# Patient Record
Sex: FEMALE | Race: WHITE | Employment: PART TIME | URBAN - METROPOLITAN AREA
[De-identification: names, ages, dates, MRNs, and addresses within clinical notes are randomized per-mention and may not be internally consistent; named-entity substitution may affect disease eponyms.]

---

## 2017-01-30 ENCOUNTER — HOSPITAL ENCOUNTER (OUTPATIENT)
Dept: MAMMOGRAPHY | Age: 67
Discharge: HOME | End: 2017-01-30
Attending: OBSTETRICS & GYNECOLOGY
Payer: COMMERCIAL

## 2017-01-30 DIAGNOSIS — M85.80 OSTEOPENIA: ICD-10-CM

## 2017-01-30 DIAGNOSIS — Z12.31 ENCOUNTER FOR MAMMOGRAM TO ESTABLISH BASELINE MAMMOGRAM: ICD-10-CM

## 2017-01-30 PROCEDURE — 77067 SCR MAMMO BI INCL CAD: CPT

## 2017-01-30 PROCEDURE — 77080 DXA BONE DENSITY AXIAL: CPT

## 2018-02-02 ENCOUNTER — HOSPITAL ENCOUNTER (OUTPATIENT)
Dept: MAMMOGRAPHY | Age: 68
Discharge: HOME | End: 2018-02-02
Attending: OBSTETRICS & GYNECOLOGY
Payer: COMMERCIAL

## 2018-02-02 DIAGNOSIS — Z12.31 VISIT FOR SCREENING MAMMOGRAM: ICD-10-CM

## 2018-02-02 PROCEDURE — 77067 SCR MAMMO BI INCL CAD: CPT

## 2019-02-04 ENCOUNTER — HOSPITAL ENCOUNTER (OUTPATIENT)
Dept: MAMMOGRAPHY | Age: 69
Discharge: HOME | End: 2019-02-04
Attending: OBSTETRICS & GYNECOLOGY
Payer: COMMERCIAL

## 2019-02-04 DIAGNOSIS — Z12.39 SCREENING BREAST EXAMINATION: ICD-10-CM

## 2019-02-04 PROCEDURE — 77063 BREAST TOMOSYNTHESIS BI: CPT

## 2020-02-27 ENCOUNTER — HOSPITAL ENCOUNTER (OUTPATIENT)
Dept: MAMMOGRAPHY | Age: 70
Discharge: HOME | End: 2020-02-27
Attending: OBSTETRICS & GYNECOLOGY
Payer: COMMERCIAL

## 2020-02-27 DIAGNOSIS — Z13.820 SCREENING FOR OSTEOPOROSIS: ICD-10-CM

## 2020-02-27 DIAGNOSIS — Z12.31 VISIT FOR SCREENING MAMMOGRAM: ICD-10-CM

## 2020-02-27 PROCEDURE — 77063 BREAST TOMOSYNTHESIS BI: CPT

## 2020-02-27 PROCEDURE — 77080 DXA BONE DENSITY AXIAL: CPT

## 2021-02-09 ENCOUNTER — TRANSCRIBE ORDER (OUTPATIENT)
Dept: SCHEDULING | Age: 71
End: 2021-02-09

## 2021-02-09 DIAGNOSIS — Z12.31 SCREENING MAMMOGRAM FOR HIGH-RISK PATIENT: Primary | ICD-10-CM

## 2021-03-01 ENCOUNTER — HOSPITAL ENCOUNTER (OUTPATIENT)
Dept: MAMMOGRAPHY | Age: 71
Discharge: HOME | End: 2021-03-01
Attending: OBSTETRICS & GYNECOLOGY
Payer: COMMERCIAL

## 2021-03-01 DIAGNOSIS — Z12.31 SCREENING MAMMOGRAM FOR HIGH-RISK PATIENT: ICD-10-CM

## 2021-03-01 PROCEDURE — 77063 BREAST TOMOSYNTHESIS BI: CPT

## 2022-03-01 ENCOUNTER — TRANSCRIBE ORDER (OUTPATIENT)
Dept: SCHEDULING | Age: 72
End: 2022-03-01

## 2022-03-01 DIAGNOSIS — Z12.31 SCREENING MAMMOGRAM, ENCOUNTER FOR: Primary | ICD-10-CM

## 2022-04-14 ENCOUNTER — HOSPITAL ENCOUNTER (OUTPATIENT)
Dept: MAMMOGRAPHY | Age: 72
Discharge: HOME | End: 2022-04-14
Attending: OBSTETRICS & GYNECOLOGY
Payer: MEDICARE

## 2022-04-14 DIAGNOSIS — Z12.31 SCREENING MAMMOGRAM, ENCOUNTER FOR: ICD-10-CM

## 2022-04-14 PROCEDURE — 77063 BREAST TOMOSYNTHESIS BI: CPT

## 2023-03-23 ENCOUNTER — TRANSCRIBE ORDER (OUTPATIENT)
Dept: SCHEDULING | Age: 73
End: 2023-03-23

## 2023-03-23 DIAGNOSIS — Z12.31 SCREENING MAMMOGRAM FOR HIGH-RISK PATIENT: Primary | ICD-10-CM

## 2023-04-23 DIAGNOSIS — Z12.31 SCREENING MAMMOGRAM FOR HIGH-RISK PATIENT: Primary | ICD-10-CM

## 2023-04-28 ENCOUNTER — HOSPITAL ENCOUNTER (OUTPATIENT)
Dept: MAMMOGRAPHY | Age: 73
Discharge: HOME | End: 2023-04-28
Attending: INTERNAL MEDICINE
Payer: MEDICARE

## 2023-04-28 DIAGNOSIS — Z12.31 SCREENING MAMMOGRAM FOR HIGH-RISK PATIENT: ICD-10-CM

## 2023-04-28 PROCEDURE — 77063 BREAST TOMOSYNTHESIS BI: CPT

## 2023-06-07 ENCOUNTER — HOSPITAL ENCOUNTER (OUTPATIENT)
Facility: HOSPITAL | Age: 73
Discharge: HOME | End: 2023-06-10
Attending: OBSTETRICS & GYNECOLOGY
Payer: MEDICARE

## 2023-06-07 DIAGNOSIS — Z13.820 SCREENING FOR OSTEOPOROSIS: ICD-10-CM

## 2023-06-07 DIAGNOSIS — N95.8 OTHER SPECIFIED MENOPAUSAL AND PERIMENOPAUSAL DISORDERS: ICD-10-CM

## 2023-06-07 PROCEDURE — 77080 DXA BONE DENSITY AXIAL: CPT

## 2023-10-30 ENCOUNTER — ANESTHESIA EVENT (OUTPATIENT)
Facility: HOSPITAL | Age: 73
End: 2023-10-30
Payer: MEDICARE

## 2023-10-30 ENCOUNTER — HOSPITAL ENCOUNTER (OUTPATIENT)
Facility: HOSPITAL | Age: 73
Setting detail: OUTPATIENT SURGERY
Discharge: HOME | End: 2023-10-30
Attending: INTERNAL MEDICINE | Admitting: INTERNAL MEDICINE
Payer: MEDICARE

## 2023-10-30 ENCOUNTER — ANESTHESIA (OUTPATIENT)
Facility: HOSPITAL | Age: 73
End: 2023-10-30
Payer: MEDICARE

## 2023-10-30 VITALS
TEMPERATURE: 97.7 F | HEART RATE: 72 BPM | DIASTOLIC BLOOD PRESSURE: 66 MMHG | OXYGEN SATURATION: 100 % | WEIGHT: 115 LBS | HEIGHT: 66 IN | BODY MASS INDEX: 18.48 KG/M2 | RESPIRATION RATE: 15 BRPM | SYSTOLIC BLOOD PRESSURE: 131 MMHG

## 2023-10-30 PROCEDURE — 7100000010 HC PHASE II RECOVERY - FIRST 15 MIN: Performed by: INTERNAL MEDICINE

## 2023-10-30 PROCEDURE — 6360000002 HC RX W HCPCS: Performed by: NURSE ANESTHETIST, CERTIFIED REGISTERED

## 2023-10-30 PROCEDURE — 7100000011 HC PHASE II RECOVERY - ADDTL 15 MIN: Performed by: INTERNAL MEDICINE

## 2023-10-30 PROCEDURE — 3600007512: Performed by: INTERNAL MEDICINE

## 2023-10-30 PROCEDURE — 2580000003 HC RX 258: Performed by: INTERNAL MEDICINE

## 2023-10-30 PROCEDURE — 2500000003 HC RX 250 WO HCPCS: Performed by: NURSE ANESTHETIST, CERTIFIED REGISTERED

## 2023-10-30 PROCEDURE — 3700000001 HC ADD 15 MINUTES (ANESTHESIA): Performed by: INTERNAL MEDICINE

## 2023-10-30 PROCEDURE — 88305 TISSUE EXAM BY PATHOLOGIST: CPT

## 2023-10-30 PROCEDURE — 3700000000 HC ANESTHESIA ATTENDED CARE: Performed by: INTERNAL MEDICINE

## 2023-10-30 PROCEDURE — 3600007502: Performed by: INTERNAL MEDICINE

## 2023-10-30 RX ORDER — SODIUM CHLORIDE 9 MG/ML
INJECTION, SOLUTION INTRAVENOUS CONTINUOUS
Status: DISCONTINUED | OUTPATIENT
Start: 2023-10-30 | End: 2023-10-30 | Stop reason: HOSPADM

## 2023-10-30 RX ORDER — LIDOCAINE HYDROCHLORIDE 20 MG/ML
INJECTION, SOLUTION EPIDURAL; INFILTRATION; INTRACAUDAL; PERINEURAL PRN
Status: DISCONTINUED | OUTPATIENT
Start: 2023-10-30 | End: 2023-10-30 | Stop reason: SDUPTHER

## 2023-10-30 RX ORDER — ATORVASTATIN CALCIUM 20 MG/1
1 TABLET, FILM COATED ORAL
COMMUNITY
Start: 2021-12-10

## 2023-10-30 RX ORDER — SODIUM CHLORIDE 0.9 % (FLUSH) 0.9 %
5-40 SYRINGE (ML) INJECTION PRN
Status: DISCONTINUED | OUTPATIENT
Start: 2023-10-30 | End: 2023-10-30 | Stop reason: HOSPADM

## 2023-10-30 RX ORDER — SODIUM CHLORIDE 9 MG/ML
25 INJECTION, SOLUTION INTRAVENOUS PRN
Status: DISCONTINUED | OUTPATIENT
Start: 2023-10-30 | End: 2023-10-30 | Stop reason: HOSPADM

## 2023-10-30 RX ORDER — SODIUM CHLORIDE 0.9 % (FLUSH) 0.9 %
5-40 SYRINGE (ML) INJECTION EVERY 12 HOURS SCHEDULED
Status: DISCONTINUED | OUTPATIENT
Start: 2023-10-30 | End: 2023-10-30 | Stop reason: HOSPADM

## 2023-10-30 RX ADMIN — SODIUM CHLORIDE: 9 INJECTION, SOLUTION INTRAVENOUS at 10:26

## 2023-10-30 RX ADMIN — PROPOFOL 30 MG: 10 INJECTION, EMULSION INTRAVENOUS at 11:01

## 2023-10-30 RX ADMIN — LIDOCAINE HYDROCHLORIDE 60 MG: 20 INJECTION, SOLUTION EPIDURAL; INFILTRATION; INTRACAUDAL; PERINEURAL at 10:46

## 2023-10-30 RX ADMIN — PROPOFOL 30 MG: 10 INJECTION, EMULSION INTRAVENOUS at 10:53

## 2023-10-30 RX ADMIN — PROPOFOL 30 MG: 10 INJECTION, EMULSION INTRAVENOUS at 10:49

## 2023-10-30 RX ADMIN — PROPOFOL 30 MG: 10 INJECTION, EMULSION INTRAVENOUS at 11:05

## 2023-10-30 RX ADMIN — PROPOFOL 30 MG: 10 INJECTION, EMULSION INTRAVENOUS at 10:57

## 2023-10-30 RX ADMIN — PROPOFOL 30 MG: 10 INJECTION, EMULSION INTRAVENOUS at 10:59

## 2023-10-30 RX ADMIN — PROPOFOL 30 MG: 10 INJECTION, EMULSION INTRAVENOUS at 10:55

## 2023-10-30 RX ADMIN — PROPOFOL 100 MG: 10 INJECTION, EMULSION INTRAVENOUS at 10:47

## 2023-10-30 RX ADMIN — PROPOFOL 30 MG: 10 INJECTION, EMULSION INTRAVENOUS at 10:51

## 2023-10-30 ASSESSMENT — PAIN - FUNCTIONAL ASSESSMENT: PAIN_FUNCTIONAL_ASSESSMENT: NONE - DENIES PAIN

## 2023-10-30 NOTE — OP NOTE
[facility]  [address], [address] [facility]      Colonoscopy Operative Report    Taya Ballesteros  [id number]  1950      Procedure Type:   Colonoscopy with polypectomy (hot biopsy)     Indications:    Screening colonoscopy       Pre-operative Diagnosis: see indication above    Post-operative Diagnosis:  See findings below    :  Tino Heimlich, MD    Surgical Assistant: Circulator: Shahram Colindres RN  Circulator Assist: Nery Pillai RN    Implants:  None    Referring Provider: Foster Gimenez MD      Sedation:  MAC anesthesia Propofol      Procedure Details:  After informed consent was obtained with all risks and benefits of procedure explained and preoperative exam completed, the patient was taken to the endoscopy suite and placed in the left lateral decubitus position. Upon sequential sedation as per above, a digital rectal exam was performed demonstrating internal hemorrhoids. The Olympus videocolonoscope  was inserted in the rectum and carefully advanced to the cecum, which was identified by the ileocecal valve and appendiceal orifice. The cecum was identified by the ileocecal valve and appendiceal orifice. The quality of preparation was good. The colonoscope was slowly withdrawn with careful evaluation between folds. Retroflexion in the rectum was completed . Findings:   Rectum: normal  Sigmoid: moderate diverticulosis; Descending Colon: mild diverticulosis;  Transverse Colon: normal  Ascending Colon: 1 cm sessile polyp, removed by hot biopsies    Cecum: normal    Specimen Removed:   as above     Complications: None. EBL:  None. Impression:     see findings     Recommendations: --Await pathology.       Recommendation for next colonscopy in 3 versus 5  years    Signed By: Tino Heimlich, MD     10/30/2023  11:11 AM

## 2023-10-30 NOTE — H&P
[facility]  [address], [address] [facility]      History and Physical       NAME:  Maile Jenkins   :   1950   MRN:   [id number]             History of Present Illness:  Patient is a 72 y.o. who is seen for screening colonoscopy . PMH:  Past Medical History:   Diagnosis Date    Hypercholesterolemia 2012    Menopause     Osteopenia 2012    Postmenopause 2012    Skin cancer screening 2012       PSH:  Past Surgical History:   Procedure Laterality Date    [address]    rectal polyp, otherwise benign; Dr Speedy Correa      \"normal\", repeat 5 yrs d/t h/o polyp; Dr Naeem Lamas  3/2005    ECHO STRESS  10/2008    borderline LVH; negative for ischemia, borderline hypertensive repsonse to exercise    ECHOCARDIOGRAM 2D W DOPPLER W COLOR LIMITED  10/2008    normal EF 60 %    ELECTROCARDIOGRAM REPORT  10/2008    LVH, sinus bradycardia, left anterior fascicular block    HM DEXA SCAN  2012 per Gyn    Osteopenia; hip better, spine same       Allergies:  No Known Allergies    Home Medications:  Prior to Admission Medications   Prescriptions Last Dose Informant Patient Reported? Taking? Calcium Carbonate-Vitamin D 600-3. 125 MG-MCG TABS 10/29/2023  Yes No   Sig: Take by mouth daily   alendronate (FOSAMAX) 70 MG tablet Not Taking  Yes No   Sig: Take by mouth   Patient not taking: Reported on 10/30/2023   atorvastatin (LIPITOR) 20 MG tablet 10/29/2023  Yes Yes   Sig: Take 1 tablet by mouth   diphenhydrAMINE (BENADRYL) 25 MG capsule   Yes No   Sig: Take 25 mg by mouth      Facility-Administered Medications: None       Hospital Medications:  Current Facility-Administered Medications   Medication Dose Route Frequency    0.9 % sodium chloride infusion   IntraVENous Continuous    sodium chloride flush 0.9 % injection 5-40 mL  5-40 mL IntraVENous 2 times per day    sodium chloride flush 0.9 %

## 2023-10-30 NOTE — DISCHARGE INSTRUCTIONS
[facility]    [facility] DISCHARGE IN[facility]    Kye Esposito  [id number]  1950    Discomfort:  Redness at IV site- apply warm compress to area; if redness or soreness persist- contact your physician  There may be a slight amount of blood passed from the rectum  Gaseous discomfort- walking, belching will help relieve any discomfort  You may not operate a vehicle for 12 hours  You may not engage in an occupation involving machinery or appliances for rest of today  You may not drink alcoholic beverages for at least 12 hours  Avoid making any critical decisions for at least 24 hour  DIET:  You may resume your regular diet - however -  remember your colon is empty and a heavy meal will produce gas. Avoid these foods:  vegetables, fried / greasy foods, carbonated drinks     ACTIVITY:  You may  resume your normal daily activities it is recommended that you spend the remainder of the day resting -  avoid any strenuous activity. CALL M.D.   ANY SIGN OF:   Increasing pain, nausea, vomiting  Abdominal distension (swelling)  New increased bleeding (oral or rectal)  Fever (chills)  Pain in chest area  Bloody discharge from nose or mouth  Shortness of breath      Follow-up Instructions:   Call Dr. Rafiq Lowry for any questions or problems at [phone]   High fiber diet           ENDOSCOPY FINDINGS:   Your colonoscopy showed one small polyp removed and diverticulosis, rest of exam was normal .  Telephone # [phone]      Signed By: Rafiq Lowry MD     10/30/2023  11:14 AM       DISCHARGE SUMMARY from Nurse    The following personal items collected during your admission are returned to you:   Dental Appliance:    Vision:    Hearing Aid:    Jewelry:    Clothing:    Other Valuables:    Valuables sent to safe: Dose (mL/hr) Propofol : *30 mg      Learning About Coronavirus (COVID-19)  Coronavirus (COVID-19): Overview  What is coronavirus

## 2023-10-30 NOTE — PROGRESS NOTES

## 2024-04-10 ENCOUNTER — TRANSCRIBE ORDERS (OUTPATIENT)
Facility: HOSPITAL | Age: 74
End: 2024-04-10

## 2024-04-10 DIAGNOSIS — Z12.31 VISIT FOR SCREENING MAMMOGRAM: Primary | ICD-10-CM

## 2024-04-30 ENCOUNTER — HOSPITAL ENCOUNTER (OUTPATIENT)
Facility: HOSPITAL | Age: 74
Discharge: HOME | End: 2024-05-03
Payer: MEDICARE

## 2024-04-30 VITALS — BODY MASS INDEX: 18.48 KG/M2 | HEIGHT: 66 IN | WEIGHT: 115 LBS

## 2024-04-30 DIAGNOSIS — Z12.31 VISIT FOR SCREENING MAMMOGRAM: ICD-10-CM

## 2024-04-30 PROCEDURE — 77063 BREAST TOMOSYNTHESIS BI: CPT

## 2025-04-07 ENCOUNTER — TRANSCRIBE ORDERS (OUTPATIENT)
Facility: HOSPITAL | Age: 75
End: 2025-04-07

## 2025-04-07 DIAGNOSIS — Z12.31 VISIT FOR SCREENING MAMMOGRAM: Primary | ICD-10-CM

## 2025-05-02 ENCOUNTER — HOSPITAL ENCOUNTER (OUTPATIENT)
Facility: HOSPITAL | Age: 75
Discharge: HOME | End: 2025-05-02
Payer: MEDICARE

## 2025-05-02 VITALS — BODY MASS INDEX: 18.48 KG/M2 | WEIGHT: 115 LBS | HEIGHT: 66 IN

## 2025-05-02 DIAGNOSIS — Z12.31 VISIT FOR SCREENING MAMMOGRAM: ICD-10-CM

## 2025-05-02 PROCEDURE — 77063 BREAST TOMOSYNTHESIS BI: CPT

## 2025-07-01 ENCOUNTER — TRANSCRIBE ORDERS (OUTPATIENT)
Facility: HOSPITAL | Age: 75
End: 2025-07-01

## 2025-07-01 DIAGNOSIS — M81.0 SENILE OSTEOPOROSIS: Primary | ICD-10-CM

## 2025-07-24 ENCOUNTER — HOSPITAL ENCOUNTER (OUTPATIENT)
Facility: HOSPITAL | Age: 75
Discharge: HOME | End: 2025-07-27
Payer: MEDICARE

## 2025-07-24 DIAGNOSIS — M81.0 SENILE OSTEOPOROSIS: ICD-10-CM

## 2025-07-24 PROCEDURE — 77080 DXA BONE DENSITY AXIAL: CPT
